# Patient Record
Sex: FEMALE | Race: ASIAN | Employment: UNEMPLOYED | URBAN - METROPOLITAN AREA
[De-identification: names, ages, dates, MRNs, and addresses within clinical notes are randomized per-mention and may not be internally consistent; named-entity substitution may affect disease eponyms.]

---

## 2017-07-25 ENCOUNTER — APPOINTMENT (OUTPATIENT)
Dept: CT IMAGING | Facility: HOSPITAL | Age: 62
End: 2017-07-25
Attending: EMERGENCY MEDICINE

## 2017-07-25 PROCEDURE — 70450 CT HEAD/BRAIN W/O DYE: CPT | Performed by: EMERGENCY MEDICINE

## 2017-07-25 NOTE — ED INITIAL ASSESSMENT (HPI)
Patient with dizziness which started yesterday morning, she had HTN which started a few days ago.  Her blood pressure was 180s when she went to [place] [address] urgent care and was sent over to the ER

## 2017-07-26 NOTE — ED PROVIDER NOTES
Patient Seen in: [facility] Emergency Department    History   Patient presents with:  Hypertension (cardiovascular)    Stated Complaint: elevated BP    HPI    63-year-old female presents emergency department who states that she has been having some above.    PSFH elements reviewed from today and agreed except as otherwise stated in HPI.     Physical Exam   ED Triage Vitals [07/25/17 1857]  BP: (!) 171/85  Pulse: 116  Resp: 20  Temp: 98.2 °F (36.8 °C)  Temp src: Temporal  SpO2: 99 %  O2 Device: None (R W/ DIFFERENTIAL[[id number]]          Abnormal            Final result                 Please view results for these tests on the individual orders.    RAINBOW DRAW BLUE   RAINBOW DRAW GOLD   RAINBOW DRAW LAVENDER   RAINBOW DRAW LIGHT GREEN     EKG    Rate, i requesting to go home. I will give her some more blood pressure medication pills that she had run out since she has gotten back from [facility]. Also give her a little Xanax. Told to return if any chest pain shortness of breath or any other complaints.   She

## (undated) NOTE — ED AVS SNAPSHOT
[facility] Emergency Department  [place]  [address] [place] [address]  Phone:  [phone]  Fax:  [phone]          Chon Yañez   MRN: [id number]    Department:  [facility] Emergency Department   Date of Visit:  7/25/20 IF THERE IS ANY CHANGE OR WORSENING OF YOUR CONDITION, CALL YOUR PRIMARY CARE PHYSICIAN AT ONCE OR RETURN IMMEDIATELY TO THE EMERGENCY DEPARTMENT.     If you have been prescribed any medication(s), please fill your prescription right away and begin taking t